# Patient Record
Sex: MALE | Race: WHITE | NOT HISPANIC OR LATINO | Employment: OTHER
[De-identification: names, ages, dates, MRNs, and addresses within clinical notes are randomized per-mention and may not be internally consistent; named-entity substitution may affect disease eponyms.]

---

## 2021-08-20 ENCOUNTER — TRANSCRIBE ORDERS (OUTPATIENT)
Dept: OTHER | Age: 66
End: 2021-08-20

## 2021-08-20 DIAGNOSIS — H49.12 FOURTH NERVE PALSY, LEFT: Primary | ICD-10-CM

## 2021-09-27 NOTE — PROGRESS NOTES
Assessment & Plan     Hal Khan is a 66 year old male with the following diagnoses:   1. Hypertropia of left eye    2. Fourth nerve palsy, left    3. Subjective visual disturbance         Patient was sent for consultation by Dr. Nawaf Fontenot for recurrent fourth nerve palsy.     HPI:    Tash is a 66 year old man here today for evaluation of double vision.   First noted double vision in 2017, while at a track meet at sudden onset horizontal diplopia. At this time he was diagnosed with a 4th nerve palsy. He never felt that his symptoms resolved. He feels at this time his symptoms of diplopia were very mild. At most they seemed to come and go, and that felt that he was able to deal with it.     He was compensating and going ok until this spring. He feels that his diplopia is worse and has also changed in characer. It is now vertical diplopia. He feels it is worse because he is spending more hours per day with double vision. He feels that his double vision is worse at the end of the day rather than at the beginning of the day. He notes that it is worse when he looks up. Also worse when looking out into the distance in particular when he is out fishing.     Comes and goes worse and better and this year it has become more frequent and more bothersome, worse on up-gaze. Sometimes his double vision is bad enough that he gets blurry vision and then starts to get dizzy. Feels like he has a lot of eye fatigue by the end of the day. Worse on bright angel days.    In May of This year, he was given a prescription for a very small amount of prism. The bifocal was also changed at this time, and Tash got the bifocal changed but then the prism was not replaced. He had the glasses changed 1 day later.      No head injuries. No drooping of the eyelids or variability. Do decline in visual acuity.   History of LASIK both eyes. No history of high myopia. No history of thyroid disease.       Independent  historians:  wife    Review of outside testing:  No prior brain imaging.     My interpretation performed today of outside testing:      Review of outside clinical notes:  Dr. Magdiel Mortensen 4/12/2017 Care Everywhere     Past medical history:  Medications:   Hyperlipidemia - could no tolerate statins  Type 2 diabetes - takes metformin, glipizide, Dapagliflozin  Hypertension - carvedilol, losartan   Bilateral sensorineural hearing loss   History of Kidney Cancer- removed half of left kidney       Family history / social history:  No pertinent family history  No smoking, quit 25 years ago       Exam:  Visual acuity with correction was 20/20 in the right eye and 20/20 in the left eye. IOP was 8 in the right eye and 10 in the left eye. Confrontational Visual fields were full in both eyes. Ocular motility was full in all gaze directions. Color plates were 11/11 in the right eye and 11/11 in the left eye.  Pupils were equal, round and reactive to light with no RAPD.  He likes 1 base down over left eye.      Strabismus exam: left hypertropia comitant     Anterior segment exam: normal, phakic  Dilated fundus exam: unremarkable, normal.      Tests ordered and interpreted today:  Sensorimotor     Discussion of management / interpretation with another provider:   None    Assessment/Plan:   It is my impression that patient has a left fourth nerve palsy.  I do not feel this is congenital since he has low fusional amplitudes.  I suspect due to remote microvascular event or a traumatic event, but he denies head injury.  It is atypical for microvascular palsies to last this long and will obtain MRI.  I will give him a 1 base down fresnel prism over the left eye. If MRI normal and likes the Fresnel prism can get ground into his glasses.            Attending Physician Attestation:  Complete documentation of historical and exam elements from today's encounter can be found in the full encounter summary report (not reduplicated in this  progress note).  I personally obtained the chief complaint(s) and history of present illness.  I confirmed and edited as necessary the review of systems, past medical/surgical history, family history, social history, and examination findings as documented by others; and I examined the patient myself.  I personally reviewed the relevant tests, images, and reports as documented above.  I formulated and edited as necessary the assessment and plan and discussed the findings and management plan with the patient and family. I personally reviewed the ophthalmic test(s) associated with this encounter, agree with the interpretation(s) as documented by the resident/fellow, and have edited the corresponding report(s) as necessary.  - Russell Cotter MD  Ophthalmology Resident PGY3  [facility]

## 2021-09-28 ENCOUNTER — OFFICE VISIT (OUTPATIENT)
Dept: OPHTHALMOLOGY | Facility: CLINIC | Age: 66
End: 2021-09-28
Attending: OPTOMETRIST
Payer: MEDICARE

## 2021-09-28 DIAGNOSIS — H53.10 SUBJECTIVE VISUAL DISTURBANCE: Primary | ICD-10-CM

## 2021-09-28 DIAGNOSIS — H53.10 SUBJECTIVE VISUAL DISTURBANCE: ICD-10-CM

## 2021-09-28 DIAGNOSIS — H49.12 FOURTH NERVE PALSY, LEFT: ICD-10-CM

## 2021-09-28 DIAGNOSIS — H50.22 HYPERTROPIA OF LEFT EYE: Primary | ICD-10-CM

## 2021-09-28 PROCEDURE — 92060 SENSORIMOTOR EXAMINATION: CPT | Performed by: OPHTHALMOLOGY

## 2021-09-28 PROCEDURE — G0463 HOSPITAL OUTPT CLINIC VISIT: HCPCS | Mod: 25

## 2021-09-28 PROCEDURE — 92004 COMPRE OPH EXAM NEW PT 1/>: CPT | Mod: GC | Performed by: OPHTHALMOLOGY

## 2021-09-28 RX ORDER — DIAZEPAM 10 MG
10 TABLET ORAL ONCE
Qty: 1 TABLET | Refills: 0 | Status: SHIPPED | OUTPATIENT
Start: 2021-09-28 | End: 2021-09-28

## 2021-09-28 ASSESSMENT — CUP TO DISC RATIO
OS_RATIO: 0.1
OD_RATIO: 0.1

## 2021-09-28 ASSESSMENT — VISUAL ACUITY
OD_CC: 20/20
CORRECTION_TYPE: GLASSES
METHOD: SNELLEN - LINEAR
OS_CC: 20/20

## 2021-09-28 ASSESSMENT — REFRACTION_WEARINGRX
OD_SPHERE: PLANO
OS_ADD: +2.50
OS_SPHERE: +0.25
OS_AXIS: 055
OD_ADD: +2.50
OS_CYLINDER: +0.25

## 2021-09-28 ASSESSMENT — EXTERNAL EXAM - LEFT EYE: OS_EXAM: NORMAL

## 2021-09-28 ASSESSMENT — CONF VISUAL FIELD
OS_NORMAL: 1
OD_NORMAL: 1

## 2021-09-28 ASSESSMENT — REFRACTION_FINALRX: OD_VPRISM: 1.0 UP

## 2021-09-28 ASSESSMENT — TONOMETRY
OS_IOP_MMHG: 10
IOP_METHOD: ICARE
OD_IOP_MMHG: 8

## 2021-09-28 ASSESSMENT — SLIT LAMP EXAM - LIDS: COMMENTS: MILD MGD

## 2021-09-28 ASSESSMENT — EXTERNAL EXAM - RIGHT EYE: OD_EXAM: NORMAL

## 2021-09-28 NOTE — Clinical Note
9/28/2021       RE: Hal Khan  [address]  [facility] [place] [address]     Dear Colleague,    Thank you for referring your patient, Hal Khan, to the [facility] at [facility]. Please see a copy of my visit note below.         Assessment & Plan     Hal Khan is a 66 year old male with the following diagnoses:   1. Hypertropia of left eye    2. Fourth nerve palsy, left    3. Subjective visual disturbance         Patient was sent for consultation by Dr. Nawaf Fontenot for recurrent fourth nerve palsy.     HPI:    Tash is a 66 year old man here today for evaluation of double vision.   First noted double vision in 2017, while at a track meet at sudden onset horizontal diplopia. At this time he was diagnosed with a 4th nerve palsy. He never felt that his symptoms resolved. He feels at this time his symptoms of diplopia were very mild. At most they seemed to come and go, and that felt that he was able to deal with it.     He was compensating and going ok until this spring. He feels that his diplopia is worse and has also changed in characer. It is now vertical diplopia. He feels it is worse because he is spending more hours per day with double vision. He feels that his double vision is worse at the end of the day rather than at the beginning of the day. He notes that it is worse when he looks up. Also worse when looking out into the distance in particular when he is out fishing.     Comes and goes worse and better and this year it has become more frequent and more bothersome, worse on up-gaze. Sometimes his double vision is bad enough that he gets blurry vision and then starts to get dizzy. Feels like he has a lot of eye fatigue by the end of the day. Worse on bright agnel days.    In May of This year, he was given a prescription for a very small amount of prism. The bifocal was also changed at this time, and Tash got the bifocal changed but then the  prism was not replaced. He had the glasses changed 1 day later.      No head injuries. No drooping of the eyelids or variability. Do decline in visual acuity.   History of LASIK both eyes. No history of high myopia. No history of thyroid disease.       Independent historians:  wife    Review of outside testing:  No prior brain imaging.     My interpretation performed today of outside testing:      Review of outside clinical notes:  Dr. Magdiel Mortensen 4/12/2017 Care Everywhere     Past medical history:  Medications:   Hyperlipidemia - could no tolerate statins  Type 2 diabetes - takes metformin, glipizide, Dapagliflozin  Hypertension - carvedilol, losartan   Bilateral sensorineural hearing loss   History of Kidney Cancer- removed half of left kidney       Family history / social history:  No pertinent family history  No smoking, quit 25 years ago       Exam:  Visual acuity with correction was 20/20 in the right eye and 20/20 in the left eye. IOP was 8 in the right eye and 10 in the left eye. Confrontational Visual fields were full in both eyes. Ocular motility was full in all gaze directions. Color plates were 11/11 in the right eye and 11/11 in the left eye.  Pupils were equal, round and reactive to light with no RAPD.  He likes 1 base down over left eye.      Strabismus exam: left hypertropia comitant     Anterior segment exam: normal, phakic  Dilated fundus exam: unremarkable, normal.      Tests ordered and interpreted today:  Sensorimotor     Discussion of management / interpretation with another provider:   None    Assessment/Plan:   It is my impression that patient has a left fourth nerve palsy.  I do not feel this is congenital since he has low fusional amplitudes.  I suspect due to remote microvascular event or a traumatic event, but he denies head injury.  It is atypical for microvascular palsies to last this long and will obtain MRI.  I will give him a 1 base down fresnel prism over the left eye. If MRI  normal and likes the Fresnel prism can get ground into his glasses.            Attending Physician Attestation:  Complete documentation of historical and exam elements from today's encounter can be found in the full encounter summary report (not reduplicated in this progress note).  I personally obtained the chief complaint(s) and history of present illness.  I confirmed and edited as necessary the review of systems, past medical/surgical history, family history, social history, and examination findings as documented by others; and I examined the patient myself.  I personally reviewed the relevant tests, images, and reports as documented above.  I formulated and edited as necessary the assessment and plan and discussed the findings and management plan with the patient and family. I personally reviewed the ophthalmic test(s) associated with this encounter, agree with the interpretation(s) as documented by the resident/fellow, and have edited the corresponding report(s) as necessary.  - Russell Cotter MD  Ophthalmology Resident PGY3  [facility]         Again, thank you for allowing me to participate in the care of your patient.      Sincerely,    Russell Gibson MD

## 2021-09-28 NOTE — NURSING NOTE
Chief Complaints and History of Present Illnesses   Patient presents with     Diplopia Evaluation     Chief Complaint(s) and History of Present Illness(es)     Diplopia Evaluation               Comments     Hal Khan is a 66 year old male who presents today for    1. DM type II  2. Nuclear cataract both eyes   3. Left hyperphoria/ history of CN VI palsy   4. Presbyopia     Patient has a history of SIXTH nerve palsy.   Intermittent vertical diplopia  Onset April 2017. Sudden onset per patient account. Significant improvement followed by plateau. Now patient is finding he's losing his control, with new symptoms of dizziness and burred vision.     Lisa LUONG 1:13 PM September 28, 2021

## 2021-09-28 NOTE — LETTER
2021         RE:  :  MRN: Hal Khan  1955  [id number]     Dear Dr. Fontenot,    Thank you for asking me to see your very pleasant patient, Hal Khan, in neuro-ophthalmic consultation.  I would like to thank you for sending your records and I have summarized them in the history of present illness. My assessment and plan are below.  For further details, please see my attached clinic note.      Assessment & Plan     Hal Khan is a 66 year old male with the following diagnoses:   1. Hypertropia of left eye    2. Fourth nerve palsy, left    3. Subjective visual disturbance         Patient was sent for consultation by Dr. Nawaf Fontenot for recurrent fourth nerve palsy.     HPI:    Tash is a 66 year old man here today for evaluation of double vision.   First noted double vision in , while at a track meet at sudden onset horizontal diplopia. At this time he was diagnosed with a 4th nerve palsy. He never felt that his symptoms resolved. He feels at this time his symptoms of diplopia were very mild. At most they seemed to come and go, and that felt that he was able to deal with it.     He was compensating and going ok until this spring. He feels that his diplopia is worse and has also changed in characer. It is now vertical diplopia. He feels it is worse because he is spending more hours per day with double vision. He feels that his double vision is worse at the end of the day rather than at the beginning of the day. He notes that it is worse when he looks up. Also worse when looking out into the distance in particular when he is out fishing.     Comes and goes worse and better and this year it has become more frequent and more bothersome, worse on up-gaze. Sometimes his double vision is bad enough that he gets blurry vision and then starts to get dizzy. Feels like he has a lot of eye fatigue by the end of the day. Worse on bright angel days.    In May of This year, he was given a prescription  for a very small amount of prism. The bifocal was also changed at this time, and Pat got the bifocal changed but then the prism was not replaced. He had the glasses changed 1 day later.      No head injuries. No drooping of the eyelids or variability. Do decline in visual acuity.   History of LASIK both eyes. No history of high myopia. No history of thyroid disease.       Independent historians:  wife    Review of outside testing:  No prior brain imaging.     My interpretation performed today of outside testing:      Review of outside clinical notes:  Dr. Magdiel Mortensen 4/12/2017 Care Everywhere     Past medical history:  Medications:   Hyperlipidemia - could no tolerate statins  Type 2 diabetes - takes metformin, glipizide, Dapagliflozin  Hypertension - carvedilol, losartan   Bilateral sensorineural hearing loss   History of Kidney Cancer- removed half of left kidney       Family history / social history:  No pertinent family history  No smoking, quit 25 years ago       Exam:  Visual acuity with correction was 20/20 in the right eye and 20/20 in the left eye. IOP was 8 in the right eye and 10 in the left eye. Confrontational Visual fields were full in both eyes. Ocular motility was full in all gaze directions. Color plates were 11/11 in the right eye and 11/11 in the left eye.  Pupils were equal, round and reactive to light with no RAPD.  He likes 1 base down over left eye.      Strabismus exam: left hypertropia comitant     Anterior segment exam: normal, phakic  Dilated fundus exam: unremarkable, normal.      Tests ordered and interpreted today:  Sensorimotor     Discussion of management / interpretation with another provider:   None    Assessment/Plan:   It is my impression that patient has a left fourth nerve palsy.  I do not feel this is congenital since he has low fusional amplitudes.  I suspect due to remote microvascular event or a traumatic event, but he denies head injury.  It is atypical for microvascular  palsies to last this long and will obtain MRI.  I will give him a 1 base down fresnel prism over the left eye. If MRI normal and likes the Fresnel prism can get ground into his glasses.          Again, thank you for allowing me to participate in the care of your patient.      Sincerely,    Russell Gibson MD  Professor  Ophthalmology Residency   Director of Neuro-Ophthalmology  [employer] Endowed Chair  Departments of Ophthalmology, Neurology, and Neurosurgery  [facility] [place]  [address]  T - [phone]  F - [phone]  [email or web address]      CC: Nawaf Fontenot OD  [address] [address]  Via Fax: [phone]     Francisco Abreu  [facility]  [address] [place] [id number]  Via Fax: [phone]    DX = 4th nerve palsy

## 2021-10-03 ENCOUNTER — HEALTH MAINTENANCE LETTER (OUTPATIENT)
Age: 66
End: 2021-10-03

## 2021-10-18 ENCOUNTER — TELEPHONE (OUTPATIENT)
Dept: OPHTHALMOLOGY | Facility: CLINIC | Age: 66
End: 2021-10-18

## 2021-10-18 NOTE — TELEPHONE ENCOUNTER
Spoke with someone from [facility] - they received orders for MRI, pt was scheduled but then cancelled.     Dona Ching on 10/18/2021 at 8:41 AM

## 2021-10-18 NOTE — TELEPHONE ENCOUNTER
Called pt to discuss imaging order. Pt's wife answer and stated that pt was not at home. She asked if I was calling about the MRI, and stated that he cancelled the imaging appointment because he was felt scared to get the imaging done.     Wife said that pt will be home tomorrow and we can call back and discuss it with him.     Dona Ching on 10/18/2021 at 8:44 AM

## 2022-01-18 ENCOUNTER — TRANSFERRED RECORDS (OUTPATIENT)
Dept: HEALTH INFORMATION MANAGEMENT | Facility: CLINIC | Age: 67
End: 2022-01-18
Payer: MEDICARE

## 2022-01-19 ENCOUNTER — TRANSFERRED RECORDS (OUTPATIENT)
Dept: HEALTH INFORMATION MANAGEMENT | Facility: CLINIC | Age: 67
End: 2022-01-19
Payer: MEDICARE

## 2022-01-19 ENCOUNTER — DOCUMENTATION ONLY (OUTPATIENT)
Dept: OPHTHALMOLOGY | Facility: CLINIC | Age: 67
End: 2022-01-19
Payer: MEDICARE

## 2022-01-19 NOTE — PROGRESS NOTES
Patient' images have been uploaded into PACS.  Report below     Exam date: 1/18/2022 MRN: [id number] Provider: BERT MAURICE BRAIN WO/W CON  Indication:  Fourth cranial nerve palsy.  Technique:  Multiplanar, multisequence MRI of the brain performed without and with intravenous contrast, including high resolution imaging through the brainstem.    Contrast: 20 cc Dotarem.    Comparison:  None relevant available at this institution.    Findings:  Brainstem:  No mass or abnormal signal in the vicinity of the expected course of the 4th cranial nerve.  The 3rd, 5th, 6th, 7th and 8th cranial nerve complexes are intact. No abnormal signal. No cerebellopontine angle mass or mass effect.  There is normal T2 signal present within the visualized inner ear structures.  No abnormal enhancement.  Brain:  The corpus callosum is intact. The pituitary gland appears preserved. Mild degenerative change of the upper cervical spine.    There is no restricted diffusion. No intracranial hemorrhage.    The ventricles are proportionate to the cerebral sulci. The 4th ventricle appears midline. The basal cisterns appear patent. No abnormal extra-axial fluid collection identified.    Mild parenchymal volume loss.    There is no intracranial mass, abnormal mass-effect or midline shift identified. No abnormal enhancement on whole brain imaging.    Major intracranial vascular flow voids appear grossly intact. Both globes are preserved. Mild paranasal sinus mucosal disease.    Impression:  1. No abnormal mass, signal or enhancement in the vicinity of the expected course of the 4th cranial nerve.  2. The 3rd, 5th, 6th, 7th and 8th cranial nerve complexes are intact. No cerebellopontine angle mass or mass effect.  3. No acute/subacute infarct.

## 2022-01-25 NOTE — PROGRESS NOTES
Spoke to patient.  MRI didn't show anything along the course of the fourth nerve to cause his double vision.  Patient can get prism ground into glasses.  Follow up as needed for worsening symptoms.     Anne-Marie Rainey on 1/25/2022 at 10:58 AM

## 2022-09-04 ENCOUNTER — HEALTH MAINTENANCE LETTER (OUTPATIENT)
Age: 67
End: 2022-09-04

## 2023-01-15 ENCOUNTER — HEALTH MAINTENANCE LETTER (OUTPATIENT)
Age: 68
End: 2023-01-15

## 2024-02-18 ENCOUNTER — HEALTH MAINTENANCE LETTER (OUTPATIENT)
Age: 69
End: 2024-02-18